# Patient Record
Sex: FEMALE | Race: BLACK OR AFRICAN AMERICAN | NOT HISPANIC OR LATINO | URBAN - METROPOLITAN AREA
[De-identification: names, ages, dates, MRNs, and addresses within clinical notes are randomized per-mention and may not be internally consistent; named-entity substitution may affect disease eponyms.]

---

## 2020-01-15 ENCOUNTER — TELEPHONE (OUTPATIENT)
Dept: RHEUMATOLOGY | Facility: CLINIC | Age: 75
End: 2020-01-15

## 2020-01-15 NOTE — TELEPHONE ENCOUNTER
Outside records received showing labs from 12/11/2019 esr of 92, RF of 18 (high), JACKELIN 1:1280 centromere pattern

## 2020-02-05 ENCOUNTER — OFFICE VISIT (OUTPATIENT)
Dept: RHEUMATOLOGY | Facility: CLINIC | Age: 75
End: 2020-02-05
Payer: COMMERCIAL

## 2020-02-05 ENCOUNTER — HOSPITAL ENCOUNTER (OUTPATIENT)
Dept: RADIOLOGY | Facility: HOSPITAL | Age: 75
Discharge: HOME | End: 2020-02-05
Attending: INTERNAL MEDICINE
Payer: COMMERCIAL

## 2020-02-05 VITALS
BODY MASS INDEX: 25.85 KG/M2 | WEIGHT: 155.19 LBS | HEART RATE: 70 BPM | HEIGHT: 65 IN | DIASTOLIC BLOOD PRESSURE: 78 MMHG | OXYGEN SATURATION: 96 % | SYSTOLIC BLOOD PRESSURE: 138 MMHG

## 2020-02-05 DIAGNOSIS — R76.8 POSITIVE ANA (ANTINUCLEAR ANTIBODY): ICD-10-CM

## 2020-02-05 DIAGNOSIS — R91.8 GROUND GLASS OPACITY PRESENT ON IMAGING OF LUNG: ICD-10-CM

## 2020-02-05 DIAGNOSIS — M34.1 CREST SYNDROME: ICD-10-CM

## 2020-02-05 DIAGNOSIS — I73.00 RAYNAUD'S DISEASE WITHOUT GANGRENE: ICD-10-CM

## 2020-02-05 DIAGNOSIS — M34.1 CREST SYNDROME: Primary | ICD-10-CM

## 2020-02-05 PROCEDURE — 99205 PR OFFICE/OUTPT VISIT, NEW, LEVL V, 60-74 MIN: ICD-10-PCS | Mod: S$GLB,,, | Performed by: INTERNAL MEDICINE

## 2020-02-05 PROCEDURE — 77077 JOINT SURVEY SINGLE VIEW: CPT | Mod: 26,,, | Performed by: RADIOLOGY

## 2020-02-05 PROCEDURE — 99999 PR PBB SHADOW E&M-EST. PATIENT-LVL IV: ICD-10-PCS | Mod: PBBFAC,,, | Performed by: INTERNAL MEDICINE

## 2020-02-05 PROCEDURE — 1126F AMNT PAIN NOTED NONE PRSNT: CPT | Mod: S$GLB,,, | Performed by: INTERNAL MEDICINE

## 2020-02-05 PROCEDURE — 1126F PR PAIN SEVERITY QUANTIFIED, NO PAIN PRESENT: ICD-10-PCS | Mod: S$GLB,,, | Performed by: INTERNAL MEDICINE

## 2020-02-05 PROCEDURE — 99205 OFFICE O/P NEW HI 60 MIN: CPT | Mod: S$GLB,,, | Performed by: INTERNAL MEDICINE

## 2020-02-05 PROCEDURE — 77077 JOINT SURVEY SINGLE VIEW: CPT | Mod: TC,PO

## 2020-02-05 PROCEDURE — 1101F PT FALLS ASSESS-DOCD LE1/YR: CPT | Mod: CPTII,S$GLB,, | Performed by: INTERNAL MEDICINE

## 2020-02-05 PROCEDURE — 1101F PR PT FALLS ASSESS DOC 0-1 FALLS W/OUT INJ PAST YR: ICD-10-PCS | Mod: CPTII,S$GLB,, | Performed by: INTERNAL MEDICINE

## 2020-02-05 PROCEDURE — 1159F MED LIST DOCD IN RCRD: CPT | Mod: S$GLB,,, | Performed by: INTERNAL MEDICINE

## 2020-02-05 PROCEDURE — 1159F PR MEDICATION LIST DOCUMENTED IN MEDICAL RECORD: ICD-10-PCS | Mod: S$GLB,,, | Performed by: INTERNAL MEDICINE

## 2020-02-05 PROCEDURE — 77077 XR ARTHRITIS SURVEY: ICD-10-PCS | Mod: 26,,, | Performed by: RADIOLOGY

## 2020-02-05 PROCEDURE — 99999 PR PBB SHADOW E&M-EST. PATIENT-LVL IV: CPT | Mod: PBBFAC,,, | Performed by: INTERNAL MEDICINE

## 2020-02-05 RX ORDER — AMLODIPINE BESYLATE 10 MG/1
TABLET ORAL
COMMUNITY
Start: 2020-01-31

## 2020-02-05 RX ORDER — ERGOCALCIFEROL 1.25 MG/1
CAPSULE ORAL
COMMUNITY
Start: 2019-12-06

## 2020-02-05 ASSESSMENT — ROUTINE ASSESSMENT OF PATIENT INDEX DATA (RAPID3)
PAIN SCORE: 0
PATIENT GLOBAL ASSESSMENT SCORE: 0
FATIGUE SCORE: 0
MDHAQ FUNCTION SCORE: 0
PSYCHOLOGICAL DISTRESS SCORE: 0
AM STIFFNESS SCORE: 0, NO
TOTAL RAPID3 SCORE: 0

## 2020-02-05 NOTE — LETTER
February 5, 2020      Briana Bains MD  [address] [place] [address]  [address]  [place] [address]           [facility]  [address] [facility] [place] [address]  Phone: [phone]  Fax: [phone]          Patient: Fallon Lewis   MR Number: [id number]   YOB: 1945   Date of Visit: 2/5/2020       Dear Dr. Briana Bains:    Thank you for referring Fallon Lewis to me for evaluation. Attached you will find relevant portions of my assessment and plan of care.    If you have questions, please do not hesitate to call me. I look forward to following Fallon Lewis along with you.    Sincerely,    Dannielle Cuenca,     Enclosure  CC:  No Recipients    If you would like to receive this communication electronically, please contact [email or web address] or [phone] to request more information on [employer] access.    For providers and/or their staff who would like to refer a patient to [facility], please contact us through our one-stop-shop provider referral line, [facility] , at [phone].    If you feel you have received this communication in error or would no longer like to receive these types of communications, please e-mail [email or web address]

## 2020-02-05 NOTE — PROGRESS NOTES
Subjective:       Patient ID: Fallon Lewis is a 75 y.o. female.    Chief Complaint: +JACKELIN  HPI  Pt is a 75 year old female with PMH of HTN and raynauds who presented today for +RF and +JACKELIN centromere 1:1280. Pt was referred by Dr Brianne Bains her PCP.    Pt stated that she really doesn't have many symptoms but sometimes she will have some pain in her b/l proximal arms which occurs after work.  Pt denies joint pain, no swelling of her joints, pt denying morning stiffness.  Pt admits to raynauds with her fingertips turning blue or white in the cold or even without the cold, this occurs about 1-2 times a month. Pt has been on amlodipine for her BP for about 6 years.  No dry mouth, dry eyes, fever, sob, chest pain, cough, oral or nasal ulcerations, n/v/d, neck or low back pain.  Pt has lost about 18 pounds over the last few months, unintentionally due to not eating as much.  Pt also admits to hair loss and hair thinning.  No dysphagia and denying esophageal issues. Pt will intermittently get reflux when she eats spicy foods, but no reflux on a regular basis.  Pt admits to constipation for most of her life, she takes mineral oil or takes linzess.  Pt does admit to some white spots on her lower extremities. Pt also admits to having tightness in her fingers for many years without pain.  Pt denying telangectasias.  No joint pain today.    Former smoker, quit over 35 years ago, smoked for 2 years, 1 pack per week, no drinking, no drug use.  Pt is  at [employer]. Pt has 1 daughter.  No h/o pre-eclampsia or eclampsia with the birth. No h/o miscarriages. Patient denied family history of RA, lupus, psoriasis, scleroderma, sjogrens, sarcoidosis, UC or Crohn's disease.    Review of Systems   Constitutional: Positive for unexpected weight change. Negative for chills, diaphoresis, fatigue and fever.   HENT: Negative for congestion, hearing loss, mouth sores, nosebleeds, sore throat, trouble swallowing and voice  "change.    Eyes: Negative for photophobia, pain, redness and visual disturbance.   Respiratory: Negative for cough, chest tightness and shortness of breath.    Cardiovascular: Negative for chest pain and palpitations.   Gastrointestinal: Positive for constipation. Negative for abdominal pain, diarrhea, nausea and vomiting.   Genitourinary: Negative for difficulty urinating, dysuria, genital sores and hematuria.   Musculoskeletal: Positive for myalgias. Negative for arthralgias, back pain, joint swelling, neck pain and neck stiffness.   Skin: Positive for color change. Negative for rash.   Neurological: Negative for seizures, weakness, numbness and headaches.   Psychiatric/Behavioral: Negative for agitation, confusion and sleep disturbance. The patient is not nervous/anxious.          Objective:   /78   Pulse 70   Ht 5' 5" (1.651 m)   Wt 70.4 kg (155 lb 3.3 oz)   SpO2 96%   BMI 25.83 kg/m²      Physical Exam   Nursing note and vitals reviewed.  Constitutional: She is oriented to person, place, and time and well-developed, well-nourished, and in no distress. No distress.   HENT:   Head: Normocephalic and atraumatic.   Right Ear: External ear normal.   Left Ear: External ear normal.   Mouth/Throat: Oropharynx is clear and moist. No oropharyngeal exudate.   Eyes: Conjunctivae and EOM are normal. Right eye exhibits no discharge. Left eye exhibits no discharge. No scleral icterus.   Neck: Neck supple. No tracheal deviation present.   Cardiovascular: Normal rate, regular rhythm and normal heart sounds.    No murmur heard.  Pulmonary/Chest: Effort normal and breath sounds normal. No stridor. No respiratory distress. She has no wheezes. She has no rales.   Abdominal: Soft. Bowel sounds are normal. She exhibits no distension. There is no tenderness. There is no rebound.   Neurological: She is alert and oriented to person, place, and time.   Skin: Skin is warm and dry. Rash noted. She is not diaphoretic. "     telangectasias present on b/l palmar surface of 1st digits   Psychiatric: Mood and affect normal.   Musculoskeletal: She exhibits no edema, tenderness or deformity.   No active synovitis, enthesitis, dactylitis or effusion noted on exam      +sclerodactyly               Outside medical records in Care Everywhere reviewed: +JACKELIN 1:1280 centromere, RF of 18 (high)    CT chest done 2/4/2020: Calcified granulomas without worrisome pulmonary nodule in either lung.  2. Patchy groundglass opacities in both lungs could represent edema or atypical infection. Summation of these groundglass opacities in the left lung could have given the appearance of nodularity on the prior chest x-ray. Continued radiographic follow-up is warranted.  Assessment:       1. CREST syndrome    2. Ground glass opacity present on imaging of lung    3. Raynaud's disease without gangrene    4. Positive JACKELIN (antinuclear antibody)        Pt is a 75 year old female with PMH of HTN and raynauds who presented today for +RF and +JACKELIN centromere 1:1280. Pt was referred by Dr Brianne Bians her PCP.  Pt with sclerodactyly, telangectasias on exam in setting of +JACKELIN centromere pattern concerning for CREST syndrome, pt with recent CT Chest showing GGO done 2/4/20 in care everywhere which needs further workup.  Given GGO and +RF also concerned for overlap disease, although no active synovitis seen on exam, will obtain further workup.    Plan:       -check labs, urine and xray as below  -referral to pulmonary, messaged Dr Cornejo to see if she could see patient.  -continue amlodipine for BP and raynauds  -obtain echocardiogram  -RTC in 4 weeks.  Problem List Items Addressed This Visit     None      Visit Diagnoses     CREST syndrome    -  Primary    Relevant Orders    JACKELIN Screen w/Reflex    Sjogrens syndrome-A extractable nuclear antibody    Rheumatoid factor    Cyclic citrul peptide antibody, IgG    HIV-1 and HIV-2 antibodies    Hepatitis B surface antigen     HBcAB    Hepatitis B surface antibody    Hepatitis C antibody    Hepatitis A antibody, IgG    Strongyloides IgG Antibodies    Quantiferon Gold TB    RPR    Varicella zoster antibody, IgG    C3 complement    C4 complement    Cardiolipin antibody    Beta-2 glycoprotein Abs (IgA, IgG, IgM)    Anti-scleroderma antibody    RNA polymerase III Ab, IgG    Complement, total    Direct antiglobulin test    DRVVT    CBC auto differential    CK    Comprehensive metabolic panel    C-reactive protein    Sedimentation rate    Urinalysis    Protein / creatinine ratio, urine    Protein electrophoresis, serum    Immunoglobulins (IgG, IgA, IgM) Quantitative    Immunofixation electrophoresis    XR Arthritis Survey    Echo Color Flow Doppler? Yes    Ambulatory referral/consult to Pulmonology    MyoMarker Panel 3    T4, free    TSH    Vitamin D    Ground glass opacity present on imaging of lung        Relevant Orders    JACKELIN Screen w/Reflex    Sjogrens syndrome-A extractable nuclear antibody    Rheumatoid factor    Cyclic citrul peptide antibody, IgG    HIV-1 and HIV-2 antibodies    Hepatitis B surface antigen    HBcAB    Hepatitis B surface antibody    Hepatitis C antibody    Hepatitis A antibody, IgG    Strongyloides IgG Antibodies    Quantiferon Gold TB    RPR    Varicella zoster antibody, IgG    C3 complement    C4 complement    Cardiolipin antibody    Beta-2 glycoprotein Abs (IgA, IgG, IgM)    Anti-scleroderma antibody    RNA polymerase III Ab, IgG    Complement, total    Direct antiglobulin test    DRVVT    CBC auto differential    CK    Comprehensive metabolic panel    C-reactive protein    Sedimentation rate    Urinalysis    Protein / creatinine ratio, urine    Protein electrophoresis, serum    Immunoglobulins (IgG, IgA, IgM) Quantitative    Immunofixation electrophoresis    XR Arthritis Survey    Echo Color Flow Doppler? Yes    Ambulatory referral/consult to Pulmonology    MyoMarker Panel 3    T4, free    TSH    Vitamin D     Calcitriol    Angiotensin converting enzyme    Raynaud's disease without gangrene        Relevant Orders    JACKELIN Screen w/Reflex    Sjogrens syndrome-A extractable nuclear antibody    Rheumatoid factor    Cyclic citrul peptide antibody, IgG    HIV-1 and HIV-2 antibodies    Hepatitis B surface antigen    HBcAB    Hepatitis B surface antibody    Hepatitis C antibody    Hepatitis A antibody, IgG    Strongyloides IgG Antibodies    Quantiferon Gold TB    RPR    Varicella zoster antibody, IgG    C3 complement    C4 complement    Cardiolipin antibody    Beta-2 glycoprotein Abs (IgA, IgG, IgM)    Anti-scleroderma antibody    RNA polymerase III Ab, IgG    Complement, total    Direct antiglobulin test    DRVVT    CBC auto differential    CK    Comprehensive metabolic panel    C-reactive protein    Sedimentation rate    Urinalysis    Protein / creatinine ratio, urine    Protein electrophoresis, serum    Immunoglobulins (IgG, IgA, IgM) Quantitative    Immunofixation electrophoresis    XR Arthritis Survey    Echo Color Flow Doppler? Yes    Ambulatory referral/consult to Pulmonology    MyoMarker Panel 3    T4, free    TSH    Vitamin D    Calcitriol    Angiotensin converting enzyme    Positive JACKELIN (antinuclear antibody)        Relevant Orders    JACKELIN Screen w/Reflex    Sjogrens syndrome-A extractable nuclear antibody    Rheumatoid factor    Cyclic citrul peptide antibody, IgG    HIV-1 and HIV-2 antibodies    Hepatitis B surface antigen    HBcAB    Hepatitis B surface antibody    Hepatitis C antibody    Hepatitis A antibody, IgG    Strongyloides IgG Antibodies    Quantiferon Gold TB    RPR    Varicella zoster antibody, IgG    C3 complement    C4 complement    Cardiolipin antibody    Beta-2 glycoprotein Abs (IgA, IgG, IgM)    Anti-scleroderma antibody    RNA polymerase III Ab, IgG    Complement, total    Direct antiglobulin test    DRVVT    CBC auto differential    CK    Comprehensive metabolic panel    C-reactive protein     Sedimentation rate    Urinalysis    Protein / creatinine ratio, urine    Protein electrophoresis, serum    Immunoglobulins (IgG, IgA, IgM) Quantitative    Immunofixation electrophoresis    XR Arthritis Survey    Echo Color Flow Doppler? Yes    Ambulatory referral/consult to Pulmonology    MyoMarker Panel 3    T4, free    TSH    Vitamin D    Calcitriol    Angiotensin converting enzyme

## 2020-02-06 ENCOUNTER — HOSPITAL ENCOUNTER (OUTPATIENT)
Dept: CARDIOLOGY | Facility: HOSPITAL | Age: 75
Discharge: HOME | End: 2020-02-06
Attending: INTERNAL MEDICINE
Payer: COMMERCIAL

## 2020-02-06 LAB
AORTIC ROOT ANNULUS: 2.38 CM
AORTIC VALVE CUSP SEPERATION: 1.43 CM
ASCENDING AORTA: 2.16 CM
AV INDEX (PROSTH): 0.5
AV MEAN GRADIENT: 22 MMHG
AV PEAK GRADIENT: 39 MMHG
AV VALVE AREA: 1.56 CM2
AV VELOCITY RATIO: 0.44
CV ECHO LV RWT: 0.5 CM
DOP CALC AO PEAK VEL: 3.13 M/S
DOP CALC AO VTI: 65.44 CM
DOP CALC LVOT AREA: 3.1 CM2
DOP CALC LVOT DIAMETER: 2 CM
DOP CALC LVOT PEAK VEL: 1.38 M/S
DOP CALC LVOT STROKE VOLUME: 102.33 CM3
DOP CALCLVOT PEAK VEL VTI: 32.59 CM
E WAVE DECELERATION TIME: 242.46 MSEC
E/A RATIO: 0.97
E/E' RATIO: 19 M/S
ECHO LV POSTERIOR WALL: 1.06 CM (ref 0.6–1.1)
FRACTIONAL SHORTENING: 59 % (ref 28–44)
INTERVENTRICULAR SEPTUM: 0.89 CM (ref 0.6–1.1)
IVRT: 0.07 MSEC
LA MAJOR: 5.2 CM
LA MINOR: 5.23 CM
LA WIDTH: 4.85 CM
LEFT ATRIUM SIZE: 3.81 CM
LEFT ATRIUM VOLUME: 81.91 CM3
LEFT INTERNAL DIMENSION IN SYSTOLE: 1.74 CM (ref 2.1–4)
LEFT VENTRICLE DIASTOLIC VOLUME: 80.78 ML
LEFT VENTRICLE SYSTOLIC VOLUME: 8.86 ML
LEFT VENTRICULAR INTERNAL DIMENSION IN DIASTOLE: 4.25 CM (ref 3.5–6)
LEFT VENTRICULAR MASS: 135.02 G
LV LATERAL E/E' RATIO: 16.29 M/S
LV SEPTAL E/E' RATIO: 22.8 M/S
MV PEAK A VEL: 1.18 M/S
MV PEAK E VEL: 1.14 M/S
PISA TR MAX VEL: 3.02 M/S
PULM VEIN S/D RATIO: 1.25
PV PEAK D VEL: 0.59 M/S
PV PEAK S VEL: 0.74 M/S
PV PEAK VELOCITY: 1.61 CM/S
RA MAJOR: 5.06 CM
RA PRESSURE: 8 MMHG
RA WIDTH: 3.33 CM
RIGHT VENTRICULAR END-DIASTOLIC DIMENSION: 3.12 CM
RV TISSUE DOPPLER FREE WALL SYSTOLIC VELOCITY 1 (APICAL 4 CHAMBER VIEW): 12.48 CM/S
SINUS: 2.81 CM
STJ: 1.84 CM
TDI LATERAL: 0.07 M/S
TDI SEPTAL: 0.05 M/S
TDI: 0.06 M/S
TR MAX PG: 36 MMHG
TRICUSPID ANNULAR PLANE SYSTOLIC EXCURSION: 2.12 CM
TV REST PULMONARY ARTERY PRESSURE: 44 MMHG

## 2020-02-06 PROCEDURE — 93306 ECHO (CUPID ONLY): ICD-10-PCS | Mod: 26,,, | Performed by: INTERNAL MEDICINE

## 2020-02-06 PROCEDURE — 93306 TTE W/DOPPLER COMPLETE: CPT | Mod: 26,,, | Performed by: INTERNAL MEDICINE

## 2020-02-06 PROCEDURE — 93306 TTE W/DOPPLER COMPLETE: CPT

## 2020-03-02 ENCOUNTER — TELEPHONE (OUTPATIENT)
Dept: ADMINISTRATIVE | Facility: OTHER | Age: 75
End: 2020-03-02

## 2020-03-02 ENCOUNTER — LAB VISIT (OUTPATIENT)
Dept: LAB | Facility: HOSPITAL | Age: 75
End: 2020-03-02
Attending: INTERNAL MEDICINE
Payer: COMMERCIAL

## 2020-03-02 ENCOUNTER — OFFICE VISIT (OUTPATIENT)
Dept: RHEUMATOLOGY | Facility: CLINIC | Age: 75
End: 2020-03-02
Payer: COMMERCIAL

## 2020-03-02 VITALS
HEIGHT: 65 IN | SYSTOLIC BLOOD PRESSURE: 145 MMHG | DIASTOLIC BLOOD PRESSURE: 82 MMHG | WEIGHT: 154.56 LBS | HEART RATE: 70 BPM | OXYGEN SATURATION: 98 % | BODY MASS INDEX: 25.75 KG/M2

## 2020-03-02 DIAGNOSIS — M35.1 OVERLAP SYNDROME: ICD-10-CM

## 2020-03-02 DIAGNOSIS — M34.1 CREST SYNDROME: Primary | ICD-10-CM

## 2020-03-02 DIAGNOSIS — R80.9 PROTEINURIA, UNSPECIFIED TYPE: ICD-10-CM

## 2020-03-02 DIAGNOSIS — Z79.899 HIGH RISK MEDICATION USE: ICD-10-CM

## 2020-03-02 DIAGNOSIS — I27.20 PULMONARY HYPERTENSION: ICD-10-CM

## 2020-03-02 DIAGNOSIS — R91.8 GROUND GLASS OPACITY PRESENT ON IMAGING OF LUNG: ICD-10-CM

## 2020-03-02 DIAGNOSIS — D72.10 EOSINOPHILIA: ICD-10-CM

## 2020-03-02 DIAGNOSIS — M34.1 CREST SYNDROME: ICD-10-CM

## 2020-03-02 LAB
BASOPHILS # BLD AUTO: 0.02 K/UL (ref 0–0.2)
BASOPHILS NFR BLD: 0.5 % (ref 0–1.9)
CRP SERPL-MCNC: 1.1 MG/L (ref 0–8.2)
DIFFERENTIAL METHOD: ABNORMAL
EOSINOPHIL # BLD AUTO: 0.4 K/UL (ref 0–0.5)
EOSINOPHIL NFR BLD: 10.5 % (ref 0–8)
ERYTHROCYTE [DISTWIDTH] IN BLOOD BY AUTOMATED COUNT: 14.8 % (ref 11.5–14.5)
ERYTHROCYTE [SEDIMENTATION RATE] IN BLOOD BY WESTERGREN METHOD: 59 MM/HR (ref 0–36)
HCT VFR BLD AUTO: 35.3 % (ref 37–48.5)
HGB BLD-MCNC: 10.7 G/DL (ref 12–16)
IMM GRANULOCYTES # BLD AUTO: 0.01 K/UL (ref 0–0.04)
IMM GRANULOCYTES NFR BLD AUTO: 0.2 % (ref 0–0.5)
LYMPHOCYTES # BLD AUTO: 1.4 K/UL (ref 1–4.8)
LYMPHOCYTES NFR BLD: 34.3 % (ref 18–48)
MCH RBC QN AUTO: 28.5 PG (ref 27–31)
MCHC RBC AUTO-ENTMCNC: 30.3 G/DL (ref 32–36)
MCV RBC AUTO: 94 FL (ref 82–98)
MONOCYTES # BLD AUTO: 0.5 K/UL (ref 0.3–1)
MONOCYTES NFR BLD: 11.4 % (ref 4–15)
NEUTROPHILS # BLD AUTO: 1.8 K/UL (ref 1.8–7.7)
NEUTROPHILS NFR BLD: 43.1 % (ref 38–73)
NRBC BLD-RTO: 0 /100 WBC
PLATELET # BLD AUTO: 282 K/UL (ref 150–350)
PMV BLD AUTO: 9.8 FL (ref 9.2–12.9)
RBC # BLD AUTO: 3.75 M/UL (ref 4–5.4)
WBC # BLD AUTO: 4.11 K/UL (ref 3.9–12.7)

## 2020-03-02 PROCEDURE — 86140 C-REACTIVE PROTEIN: CPT

## 2020-03-02 PROCEDURE — 85652 RBC SED RATE AUTOMATED: CPT

## 2020-03-02 PROCEDURE — 99999 PR PBB SHADOW E&M-EST. PATIENT-LVL IV: CPT | Mod: PBBFAC,,, | Performed by: INTERNAL MEDICINE

## 2020-03-02 PROCEDURE — 1159F PR MEDICATION LIST DOCUMENTED IN MEDICAL RECORD: ICD-10-PCS | Mod: S$GLB,,, | Performed by: INTERNAL MEDICINE

## 2020-03-02 PROCEDURE — 1126F PR PAIN SEVERITY QUANTIFIED, NO PAIN PRESENT: ICD-10-PCS | Mod: S$GLB,,, | Performed by: INTERNAL MEDICINE

## 2020-03-02 PROCEDURE — 86255 FLUORESCENT ANTIBODY SCREEN: CPT

## 2020-03-02 PROCEDURE — 36415 COLL VENOUS BLD VENIPUNCTURE: CPT | Mod: PO

## 2020-03-02 PROCEDURE — 99215 OFFICE O/P EST HI 40 MIN: CPT | Mod: S$GLB,,, | Performed by: INTERNAL MEDICINE

## 2020-03-02 PROCEDURE — 85025 COMPLETE CBC W/AUTO DIFF WBC: CPT

## 2020-03-02 PROCEDURE — 99999 PR PBB SHADOW E&M-EST. PATIENT-LVL IV: ICD-10-PCS | Mod: PBBFAC,,, | Performed by: INTERNAL MEDICINE

## 2020-03-02 PROCEDURE — 1101F PR PT FALLS ASSESS DOC 0-1 FALLS W/OUT INJ PAST YR: ICD-10-PCS | Mod: CPTII,S$GLB,, | Performed by: INTERNAL MEDICINE

## 2020-03-02 PROCEDURE — 83516 IMMUNOASSAY NONANTIBODY: CPT

## 2020-03-02 PROCEDURE — 99215 PR OFFICE/OUTPT VISIT, EST, LEVL V, 40-54 MIN: ICD-10-PCS | Mod: S$GLB,,, | Performed by: INTERNAL MEDICINE

## 2020-03-02 PROCEDURE — 83516 IMMUNOASSAY NONANTIBODY: CPT | Mod: 59

## 2020-03-02 PROCEDURE — 1101F PT FALLS ASSESS-DOCD LE1/YR: CPT | Mod: CPTII,S$GLB,, | Performed by: INTERNAL MEDICINE

## 2020-03-02 PROCEDURE — 1126F AMNT PAIN NOTED NONE PRSNT: CPT | Mod: S$GLB,,, | Performed by: INTERNAL MEDICINE

## 2020-03-02 PROCEDURE — 1159F MED LIST DOCD IN RCRD: CPT | Mod: S$GLB,,, | Performed by: INTERNAL MEDICINE

## 2020-03-02 RX ORDER — HYDROXYCHLOROQUINE SULFATE 200 MG/1
200 TABLET, FILM COATED ORAL 2 TIMES DAILY
Qty: 180 TABLET | Refills: 0 | Status: SHIPPED | OUTPATIENT
Start: 2020-03-02 | End: 2020-06-10

## 2020-03-02 NOTE — PATIENT INSTRUCTIONS
Hydroxychloroquine (Plaquenil) is considered a disease-modifying anti-rheumatic drug (DMARD). It can decrease the pain and swelling of arthritis. It may prevent joint damage and reduce the risk of long-term disability. Hydroxychloroquine is in a class of medications that was first used to prevent and treat malaria. Today, it is used to treat rheumatoid arthritis, some symptoms of lupus, childhood arthritis (or juvenile idiopathic arthritis) and other autoimmune diseases. It is not clear why hydroxychloroquine is effective at treating autoimmune diseases. It is believed that hydroxychloroquine interferes with the communication of cells in the immune system.    How to Take It  Hydroxychloroquine comes in an oral tablet. Adult dosing ranges from 200 mg or 400 mg per day (6.5mg/kg). In some cases, higher doses can be used. It is recommended one tablet twice daily if taking more than one tablet. It is recommended to be taken with food. Symptoms can start to improve in one to two months, but it may take up to six months before the full benefits of this medication are experienced.    Hydroxychloroquine typically is very well tolerated. Serious side effects are rare. The most common side effects are nausea and diarrhea, which often improve with time. Less common side effects include rash, changes in skin pigment (such as darkening or dark spots), hair changes, and muscle weakness. Rarely, hydroxychloroquine can lead to anemia in some individuals. This can happen in individuals with a condition known as G6PD deficiency or porphyria.    In rare cases, hydroxychloroquine can cause visual changes or loss of vision. Such vision problems are more likely to occur in individuals taking high doses for many years, individuals 60 years or older, or those with significant kidney disease. At the recommended dose, development of visual problems due to the medication is rare. It is recommended that you have an eye exam within the first  year of use, then repeat every one to five years based on current guidelines.    Tell Your Doctor  Although there are few drug interactions with hydroxychloroquine, to be safe be sure to tell your doctor about all of the medications you are taking, including over-the-counter drugs and natural remedies.    Be sure to notify your other physicians when taking this drug. This drug does not have a strong effect on the immune system, so vaccines recommended by other physicians are generally acceptable. Notify your eye doctor when you are on this medication so regular visual screening tests can be performed. If you are pregnant, considering becoming pregnant, or lactating, please discuss this with your doctor before taking this medication. However, hydroxycholorquine has been shown to be safe during pregnancy and breast feeding.      Mycophenolate Mofetil (CellCept) and Mycophenolate Sodium (Myfortic) are immunosuppressant drugs (a class of drugs that reduce the strength of the bodys immune system) used in the treatment of several autoimmune diseases. Mycophenolate was used originally in the management of patients with organ transplants, but is now recommended in the treatment of many autoimmune diseases.    Mycophenolate has been used to treat people with lupus (especially those with symptoms of kidney disease), rheumatoid arthritis (RA), vasculitis, inflammatory bowel disease such as Crohn's disease, inflammatory eye disease (such as uveitis (iritis) and scleritis), and some other kidney and skin disorders.    How to Take It  Caregivers administering mycophenolate should wear gloves when handling it due to concern with pregnancy risks and effects on the immune system. In adults, mycophenolate is typically taken twice daily for a total dose of 2 - 3 grams (2000 - 3000mg) per day, although this dosage may be reduced in people with underlying kidney problems. The dose usually is lower than two grams a day for children.  Taking mycophenolate with food often helps prevent side effects such as nausea or stomach pain. Regular laboratory monitoring is required to monitor blood counts and your liver while taking mycophenolate.    Side Effects  Mycophenolate can lower the ability of your immune system to fight infections. If you develop symptoms of an infection while using this medication, you should stop it and contact your doctor.    The most common side effects of mycophenolate are nausea and upset stomach. Other possible side effects include headaches, dizziness, difficulty sleeping, tremor, or rash. Prolonged use of mycophenolate may increase risk of some cancers such as lymphomas and skin cancers.    Tell Your Doctor  You should notify your doctor if you have these symptoms while taking this medication: an infection (such as a fever or cough), diarrhea, or allergic reactions.    Make sure to notify your other physicians while you are taking this drug. Mycophenolate has been associated with birth defects and pregnancy loss. If you are pregnant or considering pregnancy, let your doctor know before starting this medication. Women should discuss birth control with their primary care physicians or gynecologists. Breast-feeding should be avoided while taking this medication because the drug can enter breast milk.    Be sure to talk with your doctor before receiving any vaccines or undergoing any surgeries while taking this medication. Live vaccines should be avoided while on this medication and you should discuss updating your vaccinations prior to starting this medication. These include the shingles vaccine; the nasal spray flu vaccine; and others such as the measles, mumps, rubella, and yellow fever vaccines.    Notify your doctor if you bruise or bleed easily, or if you experience persistent or bloody diarrhea, shortness of breath, fevers or other signs of an infection. Mycophenolate usage should halt if there are signs of an  infection.

## 2020-03-02 NOTE — PROGRESS NOTES
Subjective:       Patient ID: Fallon Lewis is a 75 y.o. female.    Chief Complaint: +JACKELIN  HPIPt is a 75 year old female with PMH of HTN and raynauds who presented initially on 2/5/2020 for +RF and +JACKELIN centromere 1:1280. Pt was referred by Dr Brianne Bains her PCP.  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 In setting of leukopenia, anemia, +JACKELIN centromere, +RF, +UGF40se, pHTN and trivial pericardial effusion on echocardiogram concern for overlap syndrome with CREST predominance.    Interval history: pt admits to ongoing raynauds which occurred yesterday, she gets that every couple of days, other wise has been feeling well, no joint pain, swelling of the joints. No rashes, no fevers, no oral or nasal ulcerations, no morning stiffness. No dry eyes or dry mouth.    Pt stated she has been doing well and just redid her roof the other day. No dysphagia.    Pt has an appt with Dr Bowman in pulmonary on 3/9/2020 and Dr Scott on 3/19/2020 for cardiology.    Pt saw Dr Blair from GI for weight loss and may have an EGD done soon.    Initial HPI 2/5/2020: Pt stated that she really doesn't have many symptoms but sometimes she will have some pain in her b/l proximal arms which occurs after work.  Pt denies joint pain, no swelling of her joints, pt denying morning stiffness.  Pt admits to raynauds with her fingertips turning blue or white in the cold or even without the cold, this occurs about 1-2 times a month. Pt has been on amlodipine for her BP for about 6 years.  No dry mouth, dry eyes, fever, sob, chest pain, cough, oral or nasal ulcerations, n/v/d, neck or low back pain.  Pt has lost about 18 pounds over the last few months, unintentionally  due to not eating as much.  Pt also admits to hair loss and hair thinning.  No dysphagia and denying esophageal issues. Pt will intermittently get reflux when she eats spicy foods, but no reflux on a regular basis.  Pt admits to constipation for most of her life, she takes mineral oil or takes linzess.  Pt does admit to some white spots on her lower extremities. Pt also admits to having tightness in her fingers for many years without pain.  Pt denying telangectasias.  No joint pain today.    Former smoker, quit over 35 years ago, smoked for 2 years, 1 pack per week, no drinking, no drug use.  Pt is  at [employer]. Pt has 1 daughter.  No h/o pre-eclampsia or eclampsia with the birth. No h/o miscarriages. Patient denied family history of RA, lupus, psoriasis, scleroderma, sjogrens, sarcoidosis, UC or Crohn's disease.    Review of Systems   Constitutional: Positive for unexpected weight change. Negative for chills, diaphoresis, fatigue and fever.   HENT: Negative for congestion, hearing loss, mouth sores, nosebleeds, sore throat, trouble swallowing and voice change.    Eyes: Negative for photophobia, pain, redness and visual disturbance.   Respiratory: Negative for cough, chest tightness and shortness of breath.    Cardiovascular: Negative for chest pain and palpitations.   Gastrointestinal: Positive for constipation. Negative for abdominal pain, diarrhea, nausea and vomiting.   Genitourinary: Negative for difficulty urinating, dysuria, genital sores and hematuria.   Musculoskeletal: Negative for arthralgias, back pain, joint swelling, myalgias, neck pain and neck stiffness.   Skin: Positive for color change. Negative for rash.   Neurological: Negative for seizures, weakness, numbness and headaches.   Psychiatric/Behavioral: Negative for agitation, confusion and sleep disturbance. The patient is not nervous/anxious.          Objective:   BP (!) 145/82 (BP Location: Right arm, Patient Position:  "Sitting, BP Method: Medium (Automatic))   Pulse 70   Ht 5' 5" (1.651 m)   Wt 70.1 kg (154 lb 8.7 oz)   SpO2 98%   BMI 25.72 kg/m²      Physical Exam   Nursing note and vitals reviewed.  Constitutional: She is oriented to person, place, and time and well-developed, well-nourished, and in no distress. No distress.   HENT:   Head: Normocephalic and atraumatic.   Right Ear: External ear normal.   Left Ear: External ear normal.   Mouth/Throat: Oropharynx is clear and moist. No oropharyngeal exudate.   Eyes: Conjunctivae and EOM are normal. Right eye exhibits no discharge. Left eye exhibits no discharge. No scleral icterus.   Neck: Neck supple. No tracheal deviation present.   Cardiovascular: Normal rate, regular rhythm and normal heart sounds.    No murmur heard.  Pulmonary/Chest: Effort normal and breath sounds normal. No stridor. No respiratory distress. She has no wheezes. She has no rales.   Abdominal: Soft. Bowel sounds are normal. She exhibits no distension. There is no tenderness. There is no rebound.   Neurological: She is alert and oriented to person, place, and time.   Skin: Skin is warm and dry. Rash noted. She is not diaphoretic.     telangectasias present on b/l palmar surface of 1st digits   Psychiatric: Mood and affect normal.   Musculoskeletal: She exhibits no edema, tenderness or deformity.   No active synovitis, enthesitis, dactylitis or effusion noted on exam      +sclerodactyly               Outside medical records in Care Everywhere reviewed: +JACKELIN 1:1280 centromere, RF of 18 (high)    CT chest done 2/4/2020: Calcified granulomas without worrisome pulmonary nodule in either lung.  2. Patchy groundglass opacities in both lungs could represent edema or atypical infection. Summation of these groundglass opacities in the left lung could have given the appearance of nodularity on the prior chest x-ray. Continued radiographic follow-up is warranted.    Echocardiogram done 2/6/2020: Normal left " ventricular systolic function. The estimated ejection fraction is 70%.  · Grade II (moderate) left ventricular diastolic dysfunction consistent with pseudonormalization.  · No wall motion abnormalities.  · Normal right ventricular systolic function.  · Small anterior pericardial effusion without hemodynamic compromise.  · Mild-to-moderate aortic valve stenosis.  · Aortic valve area is 1.56 cm2; peak velocity is 3.13 m/s; mean gradient is 22 mmHg.  · The estimated PA systolic pressure is 44 mmHg.  Pulmonary hypertension present    Xray of arthritis survey done 2/5/2020 independently reviewed showing DJD of c-spine.  Assessment:       1. CREST syndrome    2. Overlap syndrome    3. Proteinuria, unspecified type    4. Ground glass opacity present on imaging of lung    5. Pulmonary hypertension    6. High risk medication use    7. Eosinophilia        Pt is a 75 year old female with PMH of HTN and raynauds who presented initially on 2/5/2020 for +RF and +JACKELIN centromere 1:1280. Pt was referred by Dr Brianne Bains her PCP.  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    Medically complex patient, in setting of leukopenia, anemia, +JACKELIN centromere, +RF, +[id number], pHTN and trivial pericardial effusion on echocardiogram concern for overlap syndrome with CREST predominance.    Plan:       -check labs and urine as below.  -be mindful of steroids in setting of CREST.  -Risks of starting plaquenil discussed. Risks include eye toxicity and pt agrees on timely follow up with ophthalmology to avoid risks of eye toxicity. Other risks include rashes such has hyperpigmentation and vertigo, pt agrees to starting medication.  -plaquenil 200mg BID prescribed.  Pt to see her outside ophthalmologist and have note faxed to our office.  -pt to see Dr Bowman in pulmonary for pHTN and GGO on recent CT chest.  -if concern for ILD 2/2 autoimmune/overlap disease would recommend cellcept initiation.  -referral to nephrology for proteinuria  -pt to see cardiology for pericardial effusion.  -continue amlodipine for BP and raynauds  -RTC in 6 weeks.  Problem List Items Addressed This Visit     None      Visit Diagnoses     CREST syndrome    -  Primary    Relevant Medications    hydroxychloroquine (PLAQUENIL) 200 mg tablet    Other Relevant Orders    CBC auto differential    Sedimentation rate    C-reactive protein    Anti-neutrophilic cytoplasmic antibody    Myeloperoxidase Antibody (MPO)    Proteinase 3 Autoantibodies    Ambulatory referral/consult to Nephrology    Protein / creatinine ratio, urine    Urinalysis    Overlap syndrome        Relevant Medications    hydroxychloroquine (PLAQUENIL) 200 mg tablet    Other Relevant Orders    CBC auto differential    Sedimentation rate    C-reactive protein    Anti-neutrophilic cytoplasmic antibody    Myeloperoxidase Antibody (MPO)    Proteinase 3 Autoantibodies    Ambulatory referral/consult to Nephrology    Protein / creatinine ratio, urine    Urinalysis    Proteinuria, unspecified type        Relevant Orders    Protein / creatinine ratio, urine    Urinalysis    Ground glass opacity present on imaging of lung        Pulmonary hypertension        High risk medication use        Relevant Medications    hydroxychloroquine (PLAQUENIL) 200 mg tablet    Eosinophilia        Relevant Orders    Anti-neutrophilic cytoplasmic antibody    Myeloperoxidase Antibody (MPO)    Proteinase 3 Autoantibodies

## 2020-03-04 LAB
ANCA AB TITR SER IF: NORMAL TITER
MYELOPEROXIDASE AB SER-ACNC: 3 UNITS
P-ANCA TITR SER IF: NORMAL TITER
PROTEINASE3 IGG SER-ACNC: <0.2 U

## 2020-03-11 ENCOUNTER — TELEPHONE (OUTPATIENT)
Dept: PULMONOLOGY | Facility: CLINIC | Age: 75
End: 2020-03-11

## 2020-04-13 ENCOUNTER — TELEPHONE (OUTPATIENT)
Dept: RHEUMATOLOGY | Facility: CLINIC | Age: 75
End: 2020-04-13

## 2020-04-13 NOTE — TELEPHONE ENCOUNTER
Due to growing concerns in regards to the Covid19 patients in the month of April will be reschedule to the following: Virtual Visit, Telephone Visit or a Follow Up Visit in MAY 2020.     I called the patient but I was not able to reach her. I did leave a detailed voicemail.   Awaiting a callback.    Terrib 04/13/2020 ** Due to insurance companies not covering telephone visit the patient's apt had to be changed. I tried to contact the patient but I was not able to reach her. I was not able to send the patient a message via the portal because she does not have one.

## 2020-04-30 ENCOUNTER — TELEPHONE (OUTPATIENT)
Dept: PULMONOLOGY | Facility: CLINIC | Age: 75
End: 2020-04-30

## 2020-05-12 ENCOUNTER — TELEPHONE (OUTPATIENT)
Dept: RHEUMATOLOGY | Facility: CLINIC | Age: 75
End: 2020-05-12

## 2020-05-12 NOTE — TELEPHONE ENCOUNTER
Left voicemail in regards to 5/13 appointment with Dr. Cuenca stating that her appointment has been set up to a virtual visit per provider's request.

## 2020-05-13 ENCOUNTER — TELEPHONE (OUTPATIENT)
Dept: RHEUMATOLOGY | Facility: CLINIC | Age: 75
End: 2020-05-13

## 2020-05-13 NOTE — TELEPHONE ENCOUNTER
Spoke with patient in regards to 9:30 AM appt with Dr. Cuenca. Patient states that she is fine right now and will follow up with office when she is ready to reschedule. Notified patient that I will reschedule her appointment to save her slot and if new appointment time does not work she can follow up with us. Patient voices understanding and voices no further questions or concerns. Sent appointment reminder via mail.

## 2020-09-02 ENCOUNTER — TELEPHONE (OUTPATIENT)
Dept: RHEUMATOLOGY | Facility: CLINIC | Age: 75
End: 2020-09-02

## 2020-09-02 DIAGNOSIS — M34.1 CREST SYNDROME: ICD-10-CM

## 2020-09-02 DIAGNOSIS — M35.1 OVERLAP SYNDROME: ICD-10-CM

## 2020-09-02 DIAGNOSIS — Z79.899 HIGH RISK MEDICATION USE: ICD-10-CM

## 2020-09-02 RX ORDER — HYDROXYCHLOROQUINE SULFATE 200 MG/1
TABLET, FILM COATED ORAL
Qty: 60 TABLET | Refills: 0 | Status: SHIPPED | OUTPATIENT
Start: 2020-09-02 | End: 2020-09-25

## 2020-09-02 NOTE — TELEPHONE ENCOUNTER
Approved Prescriptions     hydrOXYchloroQUINE (PLAQUENIL) 200 mg tablet         Sig: TAKE 1 TABLET BY MOUTH TWICE A DAY    Disp:  60 tablet    Refills:  0    Start: 9/2/2020    Class: Normal    Authorized by: Dannielle Cuenca DO    For: CREST syndrome, Overlap syndrome, High risk medication use        To be filled at: [facility]/pharmacy #[id number] - [place], [place] - [address]        Please schedule patient for a follow up visit.    Routing comment        I called the patient but I was not able to reach him/her. I did leave a detailed voicemail.   Awaiting a callback.